# Patient Record
Sex: MALE | Race: OTHER
[De-identification: names, ages, dates, MRNs, and addresses within clinical notes are randomized per-mention and may not be internally consistent; named-entity substitution may affect disease eponyms.]

---

## 2020-09-29 ENCOUNTER — RESULT REVIEW (OUTPATIENT)
Age: 60
End: 2020-09-29

## 2021-02-25 PROBLEM — Z00.00 ENCOUNTER FOR PREVENTIVE HEALTH EXAMINATION: Status: ACTIVE | Noted: 2021-02-25

## 2021-02-26 ENCOUNTER — APPOINTMENT (OUTPATIENT)
Dept: VASCULAR SURGERY | Facility: CLINIC | Age: 61
End: 2021-02-26
Payer: COMMERCIAL

## 2021-02-26 ENCOUNTER — TRANSCRIPTION ENCOUNTER (OUTPATIENT)
Age: 61
End: 2021-02-26

## 2021-02-26 VITALS
DIASTOLIC BLOOD PRESSURE: 77 MMHG | BODY MASS INDEX: 37.62 KG/M2 | HEIGHT: 69 IN | SYSTOLIC BLOOD PRESSURE: 129 MMHG | HEART RATE: 97 BPM | WEIGHT: 254 LBS

## 2021-02-26 DIAGNOSIS — F17.200 NICOTINE DEPENDENCE, UNSPECIFIED, UNCOMPLICATED: ICD-10-CM

## 2021-02-26 DIAGNOSIS — Z80.9 FAMILY HISTORY OF MALIGNANT NEOPLASM, UNSPECIFIED: ICD-10-CM

## 2021-02-26 DIAGNOSIS — E11.9 TYPE 2 DIABETES MELLITUS W/OUT COMPLICATIONS: ICD-10-CM

## 2021-02-26 DIAGNOSIS — Z84.1 FAMILY HISTORY OF DISORDERS OF KIDNEY AND URETER: ICD-10-CM

## 2021-02-26 PROCEDURE — 99203 OFFICE O/P NEW LOW 30 MIN: CPT

## 2021-02-26 PROCEDURE — 99072 ADDL SUPL MATRL&STAF TM PHE: CPT

## 2021-02-26 PROCEDURE — 93924 LWR XTR VASC STDY BILAT: CPT

## 2021-02-26 RX ORDER — METFORMIN HYDROCHLORIDE 1000 MG/1
1000 TABLET, COATED ORAL
Refills: 0 | Status: ACTIVE | COMMUNITY

## 2021-02-26 RX ORDER — GABAPENTIN 300 MG/1
300 CAPSULE ORAL
Refills: 0 | Status: ACTIVE | COMMUNITY

## 2021-02-26 RX ORDER — INSULIN DEGLUDEC INJECTION 200 U/ML
200 INJECTION, SOLUTION SUBCUTANEOUS
Refills: 0 | Status: ACTIVE | COMMUNITY

## 2021-02-26 NOTE — ASSESSMENT
[Smoking Cessation] : smoking cessation [FreeTextEntry1] : 61 yo male with history of dm, active daily smoker presents for evaluation of left lower extremity great digit wound that has been present for 2 years\par \par sammi/pvr shows no evidence of arterial disease \par would continue with local wound care \par pt to follow up in 3 months with venous duplex and carotid duplex \par \par smoking cessation encouraged

## 2021-02-26 NOTE — CONSULT LETTER
[Dear  ___] : Dear  [unfilled], [Consult Letter:] : I had the pleasure of evaluating your patient, [unfilled]. [Please see my note below.] : Please see my note below. [Consult Closing:] : Thank you very much for allowing me to participate in the care of this patient.  If you have any questions, please do not hesitate to contact me. [Sincerely,] : Sincerely, [FreeTextEntry3] : Manny Love M.D., F.IRISS., R.P.SUSY.I.\par  of Vascular Surgery\par Assistant Professor of Radiology\par Director of Endovascular Program/ [facility]\par [facility]

## 2021-02-26 NOTE — PHYSICAL EXAM
[1+] : left 1+ [2+] : left 2+ [Varicose Veins Of Lower Extremities] : bilaterally [] : bilaterally [Ankle Swelling On The Left] : moderate [Skin Ulcer] : ulcer [Alert] : alert [Calm] : calm [JVD] : no jugular venous distention  [Normal Breath Sounds] : Normal breath sounds [Normal Heart Sounds] : normal heart sounds [Ankle Swelling (On Exam)] : not present [Abdomen Masses] : No abdominal masses [de-identified] : appears well

## 2021-02-26 NOTE — HISTORY OF PRESENT ILLNESS
[FreeTextEntry1] : 59 yo male with history of dm, active daily smoker presents for evaluation of left lower extremity great digit wound that has been present for 2 years.  pt states that after changing wound care specialist the ulcer has been healing slowly.  pt denies any history of rest pain or claudications\par

## 2021-05-07 ENCOUNTER — APPOINTMENT (OUTPATIENT)
Dept: VASCULAR SURGERY | Facility: CLINIC | Age: 61
End: 2021-05-07
Payer: COMMERCIAL

## 2021-05-07 VITALS — TEMPERATURE: 96.5 F

## 2021-05-07 PROCEDURE — 99213 OFFICE O/P EST LOW 20 MIN: CPT

## 2021-05-07 PROCEDURE — 93970 EXTREMITY STUDY: CPT

## 2021-05-07 PROCEDURE — 93880 EXTRACRANIAL BILAT STUDY: CPT

## 2021-05-07 PROCEDURE — 99072 ADDL SUPL MATRL&STAF TM PHE: CPT

## 2021-05-07 NOTE — PHYSICAL EXAM
[JVD] : no jugular venous distention  [Normal Breath Sounds] : Normal breath sounds [Normal Heart Sounds] : normal heart sounds [1+] : left 1+ [2+] : left 2+ [Ankle Swelling (On Exam)] : not present [Varicose Veins Of Lower Extremities] : bilaterally [] : bilaterally [Ankle Swelling On The Left] : moderate [Abdomen Masses] : No abdominal masses [Skin Ulcer] : ulcer [Alert] : alert [Calm] : calm [de-identified] : appears well

## 2021-05-07 NOTE — ASSESSMENT
[FreeTextEntry1] : 61 yo male with history of dm, active daily smoker presents for evaluation of left lower extremity great digit wound that has been present for 2 years, wound is healing very nicely according to the patient\par \par No evidence of significant venous insufficiency or DVT.\par \par sammi/pvr shows no evidence of arterial disease \par would continue with local wound care \par No evidence of carotid disease.\par \par smoking cessation encouraged  [Smoking Cessation] : smoking cessation

## 2021-05-07 NOTE — HISTORY OF PRESENT ILLNESS
[FreeTextEntry1] : 61 yo male with history of dm, active daily smoker presents for evaluation of left lower extremity great digit wound that has been present for 2 years.  Patient receiving wound care and the wound is healing nicely.